# Patient Record
Sex: MALE | Race: WHITE | NOT HISPANIC OR LATINO | URBAN - METROPOLITAN AREA
[De-identification: names, ages, dates, MRNs, and addresses within clinical notes are randomized per-mention and may not be internally consistent; named-entity substitution may affect disease eponyms.]

---

## 2024-05-31 ENCOUNTER — INPATIENT (INPATIENT)
Facility: HOSPITAL | Age: 29
LOS: 1 days | Discharge: HOME | DRG: 69 | End: 2024-06-02
Attending: STUDENT IN AN ORGANIZED HEALTH CARE EDUCATION/TRAINING PROGRAM | Admitting: STUDENT IN AN ORGANIZED HEALTH CARE EDUCATION/TRAINING PROGRAM
Payer: COMMERCIAL

## 2024-05-31 VITALS
SYSTOLIC BLOOD PRESSURE: 140 MMHG | RESPIRATION RATE: 15 BRPM | HEART RATE: 86 BPM | OXYGEN SATURATION: 100 % | DIASTOLIC BLOOD PRESSURE: 70 MMHG

## 2024-05-31 LAB
A1C WITH ESTIMATED AVERAGE GLUCOSE RESULT: 5.1 % — SIGNIFICANT CHANGE UP (ref 4–5.6)
ADD ON TEST-SPECIMEN IN LAB: SIGNIFICANT CHANGE UP
ALBUMIN SERPL ELPH-MCNC: 4.2 G/DL — SIGNIFICANT CHANGE UP (ref 3.3–5)
ALP SERPL-CCNC: 76 U/L — SIGNIFICANT CHANGE UP (ref 40–120)
ALT FLD-CCNC: 24 U/L — SIGNIFICANT CHANGE UP (ref 10–45)
ANION GAP SERPL CALC-SCNC: 13 MMOL/L — SIGNIFICANT CHANGE UP (ref 5–17)
APPEARANCE UR: CLEAR — SIGNIFICANT CHANGE UP
APTT BLD: 29.2 SEC — SIGNIFICANT CHANGE UP (ref 24.5–35.6)
AST SERPL-CCNC: 16 U/L — SIGNIFICANT CHANGE UP (ref 10–40)
BASOPHILS # BLD AUTO: 0.06 K/UL — SIGNIFICANT CHANGE UP (ref 0–0.2)
BASOPHILS NFR BLD AUTO: 0.8 % — SIGNIFICANT CHANGE UP (ref 0–2)
BILIRUB SERPL-MCNC: 0.9 MG/DL — SIGNIFICANT CHANGE UP (ref 0.2–1.2)
BILIRUB UR-MCNC: NEGATIVE — SIGNIFICANT CHANGE UP
BLD GP AB SCN SERPL QL: NEGATIVE — SIGNIFICANT CHANGE UP
BUN SERPL-MCNC: 12 MG/DL — SIGNIFICANT CHANGE UP (ref 7–23)
CALCIUM SERPL-MCNC: 8.9 MG/DL — SIGNIFICANT CHANGE UP (ref 8.4–10.5)
CHLORIDE SERPL-SCNC: 107 MMOL/L — SIGNIFICANT CHANGE UP (ref 96–108)
CHOLEST SERPL-MCNC: 180 MG/DL — SIGNIFICANT CHANGE UP
CK MB CFR SERPL CALC: <1 NG/ML — SIGNIFICANT CHANGE UP (ref 0–6.7)
CK SERPL-CCNC: 51 U/L — SIGNIFICANT CHANGE UP (ref 30–200)
CK SERPL-CCNC: 56 U/L — SIGNIFICANT CHANGE UP (ref 30–200)
CO2 SERPL-SCNC: 21 MMOL/L — LOW (ref 22–31)
COLOR SPEC: YELLOW — SIGNIFICANT CHANGE UP
CREAT SERPL-MCNC: 0.91 MG/DL — SIGNIFICANT CHANGE UP (ref 0.5–1.3)
DIFF PNL FLD: NEGATIVE — SIGNIFICANT CHANGE UP
EGFR: 118 ML/MIN/1.73M2 — SIGNIFICANT CHANGE UP
EOSINOPHIL # BLD AUTO: 0.19 K/UL — SIGNIFICANT CHANGE UP (ref 0–0.5)
EOSINOPHIL NFR BLD AUTO: 2.4 % — SIGNIFICANT CHANGE UP (ref 0–6)
ESTIMATED AVERAGE GLUCOSE: 100 MG/DL — SIGNIFICANT CHANGE UP (ref 68–114)
GLUCOSE SERPL-MCNC: 89 MG/DL — SIGNIFICANT CHANGE UP (ref 70–99)
GLUCOSE UR QL: NEGATIVE MG/DL — SIGNIFICANT CHANGE UP
HCT VFR BLD CALC: 44.8 % — SIGNIFICANT CHANGE UP (ref 39–50)
HDLC SERPL-MCNC: 36 MG/DL — LOW
HGB BLD-MCNC: 15 G/DL — SIGNIFICANT CHANGE UP (ref 13–17)
IMM GRANULOCYTES NFR BLD AUTO: 0.4 % — SIGNIFICANT CHANGE UP (ref 0–0.9)
INR BLD: 1.26 — HIGH (ref 0.85–1.18)
KETONES UR-MCNC: 40 MG/DL
LACTATE SERPL-SCNC: 1 MMOL/L — SIGNIFICANT CHANGE UP (ref 0.5–2)
LEUKOCYTE ESTERASE UR-ACNC: NEGATIVE — SIGNIFICANT CHANGE UP
LIPID PNL WITH DIRECT LDL SERPL: 132 MG/DL — HIGH
LYMPHOCYTES # BLD AUTO: 2.71 K/UL — SIGNIFICANT CHANGE UP (ref 1–3.3)
LYMPHOCYTES # BLD AUTO: 34.2 % — SIGNIFICANT CHANGE UP (ref 13–44)
MAGNESIUM SERPL-MCNC: 1.9 MG/DL — SIGNIFICANT CHANGE UP (ref 1.6–2.6)
MCHC RBC-ENTMCNC: 27.7 PG — SIGNIFICANT CHANGE UP (ref 27–34)
MCHC RBC-ENTMCNC: 33.5 GM/DL — SIGNIFICANT CHANGE UP (ref 32–36)
MCV RBC AUTO: 82.8 FL — SIGNIFICANT CHANGE UP (ref 80–100)
MONOCYTES # BLD AUTO: 0.59 K/UL — SIGNIFICANT CHANGE UP (ref 0–0.9)
MONOCYTES NFR BLD AUTO: 7.4 % — SIGNIFICANT CHANGE UP (ref 2–14)
NEUTROPHILS # BLD AUTO: 4.34 K/UL — SIGNIFICANT CHANGE UP (ref 1.8–7.4)
NEUTROPHILS NFR BLD AUTO: 54.8 % — SIGNIFICANT CHANGE UP (ref 43–77)
NITRITE UR-MCNC: NEGATIVE — SIGNIFICANT CHANGE UP
NON HDL CHOLESTEROL: 144 MG/DL — HIGH
NRBC # BLD: 0 /100 WBCS — SIGNIFICANT CHANGE UP (ref 0–0)
NT-PROBNP SERPL-SCNC: <36 PG/ML — SIGNIFICANT CHANGE UP (ref 0–300)
PH UR: 7 — SIGNIFICANT CHANGE UP (ref 5–8)
PHOSPHATE SERPL-MCNC: 3 MG/DL — SIGNIFICANT CHANGE UP (ref 2.5–4.5)
PLATELET # BLD AUTO: 306 K/UL — SIGNIFICANT CHANGE UP (ref 150–400)
POTASSIUM SERPL-MCNC: 3.5 MMOL/L — SIGNIFICANT CHANGE UP (ref 3.5–5.3)
POTASSIUM SERPL-SCNC: 3.5 MMOL/L — SIGNIFICANT CHANGE UP (ref 3.5–5.3)
PROT SERPL-MCNC: 6.8 G/DL — SIGNIFICANT CHANGE UP (ref 6–8.3)
PROT UR-MCNC: NEGATIVE MG/DL — SIGNIFICANT CHANGE UP
PROTHROM AB SERPL-ACNC: 14.3 SEC — HIGH (ref 9.5–13)
RBC # BLD: 5.41 M/UL — SIGNIFICANT CHANGE UP (ref 4.2–5.8)
RBC # FLD: 12.3 % — SIGNIFICANT CHANGE UP (ref 10.3–14.5)
RH IG SCN BLD-IMP: POSITIVE — SIGNIFICANT CHANGE UP
SODIUM SERPL-SCNC: 141 MMOL/L — SIGNIFICANT CHANGE UP (ref 135–145)
SP GR SPEC: >1.03 — HIGH (ref 1–1.03)
TRIGL SERPL-MCNC: 65 MG/DL — SIGNIFICANT CHANGE UP
TROPONIN T, HIGH SENSITIVITY RESULT: 7 NG/L — SIGNIFICANT CHANGE UP (ref 0–51)
TSH SERPL-MCNC: 0.72 UIU/ML — SIGNIFICANT CHANGE UP (ref 0.27–4.2)
UROBILINOGEN FLD QL: 1 MG/DL — SIGNIFICANT CHANGE UP (ref 0.2–1)
WBC # BLD: 7.92 K/UL — SIGNIFICANT CHANGE UP (ref 3.8–10.5)
WBC # FLD AUTO: 7.92 K/UL — SIGNIFICANT CHANGE UP (ref 3.8–10.5)

## 2024-05-31 PROCEDURE — 93010 ELECTROCARDIOGRAM REPORT: CPT

## 2024-05-31 PROCEDURE — 71045 X-RAY EXAM CHEST 1 VIEW: CPT | Mod: 26

## 2024-05-31 RX ORDER — PANTOPRAZOLE SODIUM 20 MG/1
1 TABLET, DELAYED RELEASE ORAL
Refills: 0 | DISCHARGE

## 2024-05-31 RX ORDER — SODIUM CHLORIDE 9 MG/ML
500 INJECTION INTRAMUSCULAR; INTRAVENOUS; SUBCUTANEOUS ONCE
Refills: 0 | Status: COMPLETED | OUTPATIENT
Start: 2024-05-31 | End: 2024-05-31

## 2024-05-31 RX ORDER — ACETAMINOPHEN 500 MG
650 TABLET ORAL EVERY 6 HOURS
Refills: 0 | Status: DISCONTINUED | OUTPATIENT
Start: 2024-05-31 | End: 2024-06-02

## 2024-05-31 RX ORDER — ASPIRIN/CALCIUM CARB/MAGNESIUM 324 MG
1 TABLET ORAL
Refills: 0 | DISCHARGE

## 2024-05-31 RX ORDER — CLONAZEPAM 1 MG
0.5 TABLET ORAL AT BEDTIME
Refills: 0 | Status: DISCONTINUED | OUTPATIENT
Start: 2024-05-31 | End: 2024-06-02

## 2024-05-31 RX ORDER — SODIUM CHLORIDE 9 MG/ML
1000 INJECTION INTRAMUSCULAR; INTRAVENOUS; SUBCUTANEOUS
Refills: 0 | Status: DISCONTINUED | OUTPATIENT
Start: 2024-05-31 | End: 2024-06-02

## 2024-05-31 RX ORDER — CLONAZEPAM 1 MG
1 TABLET ORAL
Refills: 0 | DISCHARGE

## 2024-05-31 RX ORDER — SODIUM CHLORIDE 9 MG/ML
50 INJECTION INTRAMUSCULAR; INTRAVENOUS; SUBCUTANEOUS ONCE
Refills: 0 | Status: DISCONTINUED | OUTPATIENT
Start: 2024-05-31 | End: 2024-05-31

## 2024-05-31 RX ORDER — SODIUM CHLORIDE 9 MG/ML
3 INJECTION INTRAMUSCULAR; INTRAVENOUS; SUBCUTANEOUS EVERY 8 HOURS
Refills: 0 | Status: DISCONTINUED | OUTPATIENT
Start: 2024-05-31 | End: 2024-06-02

## 2024-05-31 RX ORDER — PANTOPRAZOLE SODIUM 20 MG/1
40 TABLET, DELAYED RELEASE ORAL
Refills: 0 | Status: DISCONTINUED | OUTPATIENT
Start: 2024-05-31 | End: 2024-06-02

## 2024-05-31 RX ADMIN — SODIUM CHLORIDE 500 MILLILITER(S): 9 INJECTION INTRAMUSCULAR; INTRAVENOUS; SUBCUTANEOUS at 19:56

## 2024-05-31 RX ADMIN — SODIUM CHLORIDE 3 MILLILITER(S): 9 INJECTION INTRAMUSCULAR; INTRAVENOUS; SUBCUTANEOUS at 22:00

## 2024-05-31 RX ADMIN — SODIUM CHLORIDE 100 MILLILITER(S): 9 INJECTION INTRAMUSCULAR; INTRAVENOUS; SUBCUTANEOUS at 20:08
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
## 2024-05-31 NOTE — H&P ADULT - NSHPPHYSICALEXAM_GEN_ALL_CORE
ICU Vital Signs   T(F): 98.7  HR: 61 (31 May 2024 20:00) (61 - 88)  BP: 105/61 (31 May 2024 20:00) (95/60 - 140/70)  BP(mean): 78 (31 May 2024 20:00) (73 - 99)  RR: 15 (31 May 2024 20:00) (15 - 18)  SpO2: 99% (31 May 2024 20:00) (98% - 100%)    O2 Parameters below as of 31 May 2024 20:00  Patient On (Oxygen Delivery Method): room air    Appearance: Normal	  HEENT:   Normal oral mucosa, PERRL, EOMI	  Neck: Supple   Cardiovascular: Normal S1 S2. No JVD. No murmurs.  Respiratory: Lungs clear to auscultation B/L. No Rales, Rhonchi, Wheezing.	  Gastrointestinal:  Soft, non-tender, + BS.	  Skin: No rashes. No ecchymoses. No cyanosis.  Extremities: Normal range of motion, no clubbing, cyanosis or edema.  Vascular: Peripheral pulses palpable 2+ bilaterally.  Neurologic: Non-focal.  Psychiatry: A & O x 3, mood & affect appropriate.

## 2024-05-31 NOTE — PATIENT PROFILE ADULT - INTERNATIONAL TRAVEL
Repair Performed By Another Provider Text (Leave Blank If You Do Not Want): After obtaining clear surgical margins the defect was repaired by another provider. No
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
## 2024-05-31 NOTE — H&P ADULT - NSHPREVIEWOFSYSTEMS_GEN_ALL_CORE
Review of Systems  CONSTITUTIONAL:  Denies Fevers / chills, sweats, fatigue, weight loss, weight gain                                      NEURO:  Denies parathesias, seizures, syncope, confusion                                                                                EYES:  Denies Blurry vision, discharge, pain, loss of vision                                                                                    ENMT:  Denies Difficulty hearing, vertigo, dysphagia, epistaxis, recent dental work                                       CV:  Denies Chest pain, palpitations, JANG, orthopnea                                                                                          RESPIRATORY:  Denies Wheezing, SOB, cough / sputum, hemoptysis                                                                GI:  Denies Nausea, vommiting, diarrhea, constipation, melena, difficulty swallowing                                               : Denies Hematuria, dysuria, urgency, incontinence                                                                                         MUSKULOSKELETAL:  Denies arthritis, joint swelling, muscle weakness                                                             SKIN/BREAST:  Denies rash, itching, vincenzo loss, masses                                                                                            PSYCH:  Denies depresion, anxiety, suicidal ideation                                                                                               HEME/LYMPH:  Denies bruises easily, enlarged lymph nodes, tender lymph nodes                                        ENDOCRINE:  Denies cold intolerance, heat intolerance, polydipsia CONSTITUTIONAL: No fevers / chills, sweats, fatigue, weight loss, weight gain  NEUROLOGICAL: No parathesias, seizures, syncope, confusion  EYES: No blurry vision, discharge, pain, loss of vision  ENMT: No difficulty hearing, vertigo, dysphagia, epistaxis, recent dental work  CV: No chest pain, palpitations, JANG, orthopnea  RESPIRATORY:  No wheezing, SOB, cough / sputum, hemoptysis  GI: No nausea, vomiting, diarrhea, constipation, melena  : No hematuria, dysuria, urgency, incontinence  MUSCULOSKELETAL: No arthritis, joint swelling, muscle weakness  SKIN/BREAST: No rash, itching, hair loss, masses  PSYCHIATRY: No depression, anxiety, suicidal ideation  HEMATOLOGY:  No bruising easily, enlarged lymph nodes, tender lymph nodes  ENDOCRINE: No cold intolerance, heat intolerance, polydipsia

## 2024-06-01 LAB
ALBUMIN SERPL ELPH-MCNC: 3.8 G/DL — SIGNIFICANT CHANGE UP (ref 3.3–5)
ALP SERPL-CCNC: 68 U/L — SIGNIFICANT CHANGE UP (ref 40–120)
ALT FLD-CCNC: 22 U/L — SIGNIFICANT CHANGE UP (ref 10–45)
ANION GAP SERPL CALC-SCNC: 10 MMOL/L — SIGNIFICANT CHANGE UP (ref 5–17)
APTT BLD: 28 SEC — SIGNIFICANT CHANGE UP (ref 24.5–35.6)
AST SERPL-CCNC: 19 U/L — SIGNIFICANT CHANGE UP (ref 10–40)
BILIRUB SERPL-MCNC: 0.6 MG/DL — SIGNIFICANT CHANGE UP (ref 0.2–1.2)
BLD GP AB SCN SERPL QL: NEGATIVE — SIGNIFICANT CHANGE UP
BUN SERPL-MCNC: 14 MG/DL — SIGNIFICANT CHANGE UP (ref 7–23)
CALCIUM SERPL-MCNC: 9 MG/DL — SIGNIFICANT CHANGE UP (ref 8.4–10.5)
CHLORIDE SERPL-SCNC: 108 MMOL/L — SIGNIFICANT CHANGE UP (ref 96–108)
CO2 SERPL-SCNC: 23 MMOL/L — SIGNIFICANT CHANGE UP (ref 22–31)
CREAT SERPL-MCNC: 1.01 MG/DL — SIGNIFICANT CHANGE UP (ref 0.5–1.3)
EGFR: 104 ML/MIN/1.73M2 — SIGNIFICANT CHANGE UP
GLUCOSE SERPL-MCNC: 109 MG/DL — HIGH (ref 70–99)
HCT VFR BLD CALC: 42.5 % — SIGNIFICANT CHANGE UP (ref 39–50)
HGB BLD-MCNC: 14.5 G/DL — SIGNIFICANT CHANGE UP (ref 13–17)
INR BLD: 1.16 — SIGNIFICANT CHANGE UP (ref 0.85–1.18)
MAGNESIUM SERPL-MCNC: 2 MG/DL — SIGNIFICANT CHANGE UP (ref 1.6–2.6)
MCHC RBC-ENTMCNC: 27.9 PG — SIGNIFICANT CHANGE UP (ref 27–34)
MCHC RBC-ENTMCNC: 34.1 GM/DL — SIGNIFICANT CHANGE UP (ref 32–36)
MCV RBC AUTO: 81.9 FL — SIGNIFICANT CHANGE UP (ref 80–100)
NRBC # BLD: 0 /100 WBCS — SIGNIFICANT CHANGE UP (ref 0–0)
PHOSPHATE SERPL-MCNC: 4 MG/DL — SIGNIFICANT CHANGE UP (ref 2.5–4.5)
PLATELET # BLD AUTO: 308 K/UL — SIGNIFICANT CHANGE UP (ref 150–400)
POTASSIUM SERPL-MCNC: 4.1 MMOL/L — SIGNIFICANT CHANGE UP (ref 3.5–5.3)
POTASSIUM SERPL-SCNC: 4.1 MMOL/L — SIGNIFICANT CHANGE UP (ref 3.5–5.3)
PROT SERPL-MCNC: 6.3 G/DL — SIGNIFICANT CHANGE UP (ref 6–8.3)
PROTHROM AB SERPL-ACNC: 13.2 SEC — HIGH (ref 9.5–13)
RBC # BLD: 5.19 M/UL — SIGNIFICANT CHANGE UP (ref 4.2–5.8)
RBC # FLD: 12.6 % — SIGNIFICANT CHANGE UP (ref 10.3–14.5)
RH IG SCN BLD-IMP: POSITIVE — SIGNIFICANT CHANGE UP
SODIUM SERPL-SCNC: 141 MMOL/L — SIGNIFICANT CHANGE UP (ref 135–145)
WBC # BLD: 9.2 K/UL — SIGNIFICANT CHANGE UP (ref 3.8–10.5)
WBC # FLD AUTO: 9.2 K/UL — SIGNIFICANT CHANGE UP (ref 3.8–10.5)

## 2024-06-01 PROCEDURE — 71275 CT ANGIOGRAPHY CHEST: CPT | Mod: 26

## 2024-06-01 PROCEDURE — 74174 CTA ABD&PLVS W/CONTRAST: CPT | Mod: 26

## 2024-06-01 PROCEDURE — 99232 SBSQ HOSP IP/OBS MODERATE 35: CPT

## 2024-06-01 RX ORDER — HEPARIN SODIUM 5000 [USP'U]/ML
5000 INJECTION INTRAVENOUS; SUBCUTANEOUS EVERY 8 HOURS
Refills: 0 | Status: DISCONTINUED | OUTPATIENT
Start: 2024-06-01 | End: 2024-06-02

## 2024-06-01 RX ADMIN — SODIUM CHLORIDE 3 MILLILITER(S): 9 INJECTION INTRAMUSCULAR; INTRAVENOUS; SUBCUTANEOUS at 13:21

## 2024-06-01 RX ADMIN — HEPARIN SODIUM 5000 UNIT(S): 5000 INJECTION INTRAVENOUS; SUBCUTANEOUS at 13:32

## 2024-06-01 RX ADMIN — SODIUM CHLORIDE 3 MILLILITER(S): 9 INJECTION INTRAMUSCULAR; INTRAVENOUS; SUBCUTANEOUS at 21:56

## 2024-06-01 RX ADMIN — HEPARIN SODIUM 5000 UNIT(S): 5000 INJECTION INTRAVENOUS; SUBCUTANEOUS at 21:34

## 2024-06-01 RX ADMIN — SODIUM CHLORIDE 3 MILLILITER(S): 9 INJECTION INTRAMUSCULAR; INTRAVENOUS; SUBCUTANEOUS at 06:31

## 2024-06-01 RX ADMIN — PANTOPRAZOLE SODIUM 40 MILLIGRAM(S): 20 TABLET, DELAYED RELEASE ORAL at 07:18

## 2024-06-01 RX ADMIN — Medication 0.5 MILLIGRAM(S): at 01:38

## 2024-06-01 NOTE — CONSULT NOTE ADULT - ASSESSMENT
A/P:  Patient is a 28 year old male hx of anxiety otherwise no significant PMH/PSG hx who was transferred to [facility] for work-up of LUE numbness tingling. Multiple CT scans done outpatient and at [facility] showing hx of occluded vs. isolated L SCA with concern for Subclavian steal vs. thoracic outlet syndrome. Vascular consulted for management reccs.     Plan:   -Pending discussion with attending A/P:  Patient is a 28 year old male hx of anxiety otherwise no significant PMH/PSG hx who was transferred to [facility] for work-up of LUE numbness tingling. Multiple CT scans done outpatient and at [facility] showing hx of occluded vs. isolated L SCA with concern for Subclavian steal vs. thoracic outlet syndrome. Vascular consulted for management reccs.     Plan:   -Patients Imaging reveals an embryologic abnormality that is most likely not contributing to patient's symptoms.   -Patient pathology is not acute in nature and therefore does not require urgent w/u or evaluation.   -No vascular problem is suspected. The etiology of the patients symptoms are unclear.    -Therefore no vascular intervention is indicated at this time   -Please follow-up with Dr. Boss in clinic   -Vascular will sign-off at this time     A/P:  Patient is a 28 year old male hx of anxiety otherwise no significant PMH/PSG hx who was transferred to [facility] for work-up of LUE numbness tingling. Multiple CT scans done outpatient and at [facility] showing hx of occluded vs. isolated L SCA with concern for Subclavian steal vs. thoracic outlet syndrome. Vascular consulted for management reccs.     Plan:   -Patients Imaging reveals an embryologic abnormality that is most likely not contributing to patient's symptoms.   -Patient pathology is not acute in nature and therefore does not require urgent w/u or evaluation.   -No vascular problem is suspected. The etiology of the patients symptoms are unclear.    -Therefore no vascular intervention is indicated at this time   -Please follow-up with Dr. Boss in clinic. Call [phone] to confirm appointment time   -Vascular will sign-off at this time

## 2024-06-01 NOTE — PROGRESS NOTE ADULT - ASSESSMENT
28 year old  male with history of anxiety who presented to [facility] yesterday after having LUE numbness and tingling since 08/2023. Work up and evaluation at  included CT showing: Right-sided arch with a severely atretic versus occluded left subclavian artery origin between the aorta and the confluence of the left subclavian and left vertebral arteries. This is consistent with the clinical suspicion of subclavian steal. Transferred to [facility] for evaluation, admitted to CTICU. Repeat CT scan reviewed by Dr. Hassan, vascular consult for thoracic outlet syndrome. Transferred to .    A/P:  Neurovascular: No delirium. Pain well controlled with current regimen.  -PRN's.    Cardiovascular: Hemodynamically stable. HR controlled.  -Hemodynamically stable, CT reviewed by Dr. Hassan- NTD  -Thoracic outlet syndrome- vascular evaluation       Respiratory: 02 Sat = 98% on RA.  -Encourage ambulation, C+DB and Use of IS 10x / hr while awake.  -CXR    GI: Stable.  -protonix for GI PPX.  -Continue bowel regimen  -PO Diet.    Renal / : BUN/Cr stable  -IVF for 3 dye loads   -Monitor renal function.  -Monitor I/O's.  -Replete lytes PRN    Endocrine:    -A1c.  -TSH.    Hematologic: WNL  -f/u AM CBC    ID: Afebrile, WBC 9  -Observe for SIRS/Sepsis Syndrome.    Prophylaxis:  -DVT prophylaxis with 5000 SubQ Heparin q8h.  -SCD's    Disposition:  -Home when medically ready

## 2024-06-01 NOTE — CONSULT NOTE ADULT - SUBJECTIVE AND OBJECTIVE BOX
Vascular Attending:  Dr. Boss  HPI: Patient is a 28 year old male hx of anxiety otherwise no significant PMH/PSG hx who was transferred to [facility] for work-up of LUE numbness tingling. 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 The patient initially thought it was positional but he remained symptomatic for days after which prompted him to present to [facility] where he received a work-up including CT revealing a Right sided aortic arch with an atretic SCA that was occluded at the origin. He was diagnosed with subclavian steal at the time but did not undergo any intervention.  Recently in the last few months his symptoms have become more frequent and more severe which caused him to present to [facility] again where he underwent another CT with similar findings to his one from last year. He was transferred to the cardiac surgery service at [facility] for further work-up. 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 Upon interview patient confirms  that his symptoms have gotten worse and more frequent over the last few months. In addition to numbness/tingling he recently had an episode of severe contracture of both hands which caused him significant discomfort. He denies ever experiencing a feeling of his hand giving out or any weakness in the affected extremity. He denies a hx of syncope, balance/gait issue but does admit to random bouts of blurry vision. Patient states he has frequently been told he has a BP differential of 20-30 points between his arms. He denies hx of chest pain, SOB, nausea, vomiting, fever, claudication/rest pain. Vascular was consulted for evaluation of imaging findings and possible need for intervention for symptoms.            PMH / PSH: None          PAST MEDICAL & SURGICAL HISTORY: None      REVIEW OF SYSTEMS: As per HPI    MEDICATIONS  (STANDING):  heparin   Injectable 5000 Unit(s) SubCutaneous every 8 hours  pantoprazole    Tablet 40 milliGRAM(s) Oral before breakfast  sodium chloride 0.9% lock flush 3 milliLiter(s) IV Push every 8 hours  sodium chloride 0.9%. 1000 milliLiter(s) (100 mL/Hr) IV Continuous <Continuous>    MEDICATIONS  (PRN):  acetaminophen     Tablet .. 650 milliGRAM(s) Oral every 6 hours PRN Mild Pain (1 - 3)  clonazePAM  Tablet 0.5 milliGRAM(s) Oral at bedtime PRN Anxiety      Allergies: None        SOCIAL HISTORY:  Smoked 1 PPD X 7-9 Years (Quit 3 Months Ago.). No ETOH Abuse. No IVRDU. 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    FAMILY HISTORY: Father hx of quadruple bypass.      Vital Signs Last 24 Hrs  T(C): 36.4 (01 Jun 2024 17:28), Max: 36.5 (01 Jun 2024 12:47)  T(F): 97.6 (01 Jun 2024 17:28), Max: 97.7 (01 Jun 2024 12:47)  HR: 92 (01 Jun 2024 14:05) (47 - 92)  BP: 124/63 (01 Jun 2024 14:05) (95/60 - 140/70)    -**L arm BP: 106/56. R arm /52  BP(mean): 88 (01 Jun 2024 14:05) (70 - 99)  RR: 11 (01 Jun 2024 14:05) (11 - 18)  SpO2: 95% (01 Jun 2024 14:05) (94% - 100%)    Parameters below as of 01 Jun 2024 14:05  Patient On (Oxygen Delivery Method): room air        PHYSICAL EXAM:  Appearance: Anxious but NAD.  HEENT:   Normal oral mucosa, PERRL, EOMI	  Neck: Supple   Cardiovascular: RRR  Respiratory: Normal Respiratory effort  Gastrointestinal:  Soft, non-tender, + BS.	  Skin: No rashes. No ecchymoses. No cyanosis.  Extremities: Normal range of motion, no clubbing, cyanosis or edema.  Vascular: B/L LE pulses palpable.                RUE: Palpable radial/ulnar pulse                LUE: Palpable radial/ulnar pulse  Extremities: No changes in muscle mass or skin changes noted between  upper extremities. No significant edema of either upper extremity.  Neurologic: Non-focal.  Psychiatry: A & O x 3, mood & affect appropriate.            LABS:                        14.5   9.20  )-----------( 308      ( 01 Jun 2024 01:01 )             42.5     06-01    141  |  108  |  14  ----------------------------<  109<H>  4.1   |  23  |  1.01    Ca    9.0      01 Jun 2024 01:01  Phos  4.0     06-01  Mg     2.0     06-01    TPro  6.3  /  Alb  3.8  /  TBili  0.6  /  DBili  x   /  AST  19  /  ALT  22  /  AlkPhos  68  06-01    PT/INR - ( 01 Jun 2024 01:01 )   PT: 13.2 sec;   INR: 1.16          PTT - ( 01 Jun 2024 01:01 )  PTT:28.0 sec  Urinalysis Basic - ( 01 Jun 2024 01:01 )    Color: x / Appearance: x / SG: x / pH: x  Gluc: 109 mg/dL / Ketone: x  / Bili: x / Urobili: x   Blood: x / Protein: x / Nitrite: x   Leuk Esterase: x / RBC: x / WBC x   Sq Epi: x / Non Sq Epi: x / Bacteria: x        RADIOLOGY & ADDITIONAL STUDIES:        ACC: [id number] EXAM: CT ANGIO CHEST AORTA WAWIC ORDERED BY: PHU MCKEON    ACC: [id number] EXAM: CT ANGIO ABD PELV (W)AW IC ORDERED BY: PHU MCKEON    PROCEDURE DATE: 06/01/2024        INTERPRETATION: CLINICAL INFORMATION: Subclavian steal syndrome with chest pain LEFT arm tingling and numbness.    COMPARISON: None.    CONTRAST/COMPLICATIONS:  IV Contrast: Isovue 370 90 cc administered 10 cc discarded  Oral Contrast: NONE  Complications: None reported at time of study completion    PROCEDURE:  CT Angiography of the Chest, Abdomen and Pelvis.  Precontrast imaging was performed through the chest followed by arterial phase imaging of the chest, abdomen and pelvis.  Sagittal and coronal reformats were performed as well as 3D (MIP) reconstructions.    FINDINGS:  CHEST:  LUNGS AND LARGE AIRWAYS: Patent central airways. No pulmonary nodules.  PLEURA: No pleural effusion.  VESSELS: No thoracic aortic dissection, intramural hematoma, or aneurysm. Right-sided aortic arch. Patent innominate and left common carotid artery. The proximal left subclavian artery is isolated and not connected to the aortic arch. This is consistent with RIGHT aortic arch with isolated LEFT subclavian artery variant. There is opacification of the left subclavian artery only distal to the opacified left vertebral artery, representing subclavian steal. No central pulmonary embolism.  HEART: Heart size is normal. No pericardial effusion.  MEDIASTINUM AND GUSTABO: No lymphadenopathy.  CHEST WALL AND LOWER NECK: Symmetric bilateral gynecomastia.    ABDOMEN AND PELVIS:  LIVER: Hepatic steatosis.  BILE DUCTS: Normal caliber.  GALLBLADDER: Within normal limits.  SPLEEN: Within normal limits.  PANCREAS: Within normal limits.  ADRENALS: Within normal limits.  KIDNEYS/URETERS: Within normal limits.    BLADDER: Underdistended, which limits evaluation.  REPRODUCTIVE ORGANS: Prostate within normal limits.    BOWEL: No bowel obstruction. Appendix is normal. Minimal colonic diverticulosis.  PERITONEUM: No ascites.  VESSELS: No abdominal aortic dissection or aneurysm. The celiac trunk, superior mesenteric, renal, inferior mesenteric, common iliac, external and internal iliac, and common femoral/proximal superficial femoral arteries are widely patent.  RETROPERITONEUM/LYMPH NODES: No lymphadenopathy.  ABDOMINAL WALL: Within normal limits.  BONES: Grade 1 anterolisthesis of L5 on S1. Chronic bilateral L5 pars defects.    IMPRESSION:  1. No aortic dissection.  2. 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

## 2024-06-02 ENCOUNTER — TRANSCRIPTION ENCOUNTER (OUTPATIENT)
Age: 29
End: 2024-06-02

## 2024-06-02 VITALS
SYSTOLIC BLOOD PRESSURE: 121 MMHG | DIASTOLIC BLOOD PRESSURE: 71 MMHG | OXYGEN SATURATION: 98 % | HEART RATE: 74 BPM | RESPIRATION RATE: 18 BRPM

## 2024-06-02 PROCEDURE — 82550 ASSAY OF CK (CPK): CPT

## 2024-06-02 PROCEDURE — 86900 BLOOD TYPING SEROLOGIC ABO: CPT

## 2024-06-02 PROCEDURE — 86850 RBC ANTIBODY SCREEN: CPT

## 2024-06-02 PROCEDURE — 84443 ASSAY THYROID STIM HORMONE: CPT

## 2024-06-02 PROCEDURE — 83735 ASSAY OF MAGNESIUM: CPT

## 2024-06-02 PROCEDURE — 83880 ASSAY OF NATRIURETIC PEPTIDE: CPT

## 2024-06-02 PROCEDURE — 85610 PROTHROMBIN TIME: CPT

## 2024-06-02 PROCEDURE — 74174 CTA ABD&PLVS W/CONTRAST: CPT | Mod: MC

## 2024-06-02 PROCEDURE — 80061 LIPID PANEL: CPT

## 2024-06-02 PROCEDURE — 71275 CT ANGIOGRAPHY CHEST: CPT | Mod: MC

## 2024-06-02 PROCEDURE — 83036 HEMOGLOBIN GLYCOSYLATED A1C: CPT

## 2024-06-02 PROCEDURE — 93005 ELECTROCARDIOGRAM TRACING: CPT

## 2024-06-02 PROCEDURE — 84484 ASSAY OF TROPONIN QUANT: CPT

## 2024-06-02 PROCEDURE — 82553 CREATINE MB FRACTION: CPT

## 2024-06-02 PROCEDURE — 83605 ASSAY OF LACTIC ACID: CPT

## 2024-06-02 PROCEDURE — 71045 X-RAY EXAM CHEST 1 VIEW: CPT

## 2024-06-02 PROCEDURE — 99239 HOSP IP/OBS DSCHRG MGMT >30: CPT

## 2024-06-02 PROCEDURE — 84100 ASSAY OF PHOSPHORUS: CPT

## 2024-06-02 PROCEDURE — 85730 THROMBOPLASTIN TIME PARTIAL: CPT

## 2024-06-02 PROCEDURE — 81003 URINALYSIS AUTO W/O SCOPE: CPT

## 2024-06-02 PROCEDURE — 85027 COMPLETE CBC AUTOMATED: CPT

## 2024-06-02 PROCEDURE — 80053 COMPREHEN METABOLIC PANEL: CPT

## 2024-06-02 PROCEDURE — 36415 COLL VENOUS BLD VENIPUNCTURE: CPT

## 2024-06-02 PROCEDURE — G0378: CPT

## 2024-06-02 PROCEDURE — 86901 BLOOD TYPING SEROLOGIC RH(D): CPT

## 2024-06-02 PROCEDURE — 85025 COMPLETE CBC W/AUTO DIFF WBC: CPT

## 2024-06-02 RX ORDER — ACETAMINOPHEN 500 MG
2 TABLET ORAL
Qty: 0 | Refills: 0 | DISCHARGE
Start: 2024-06-02

## 2024-06-02 RX ADMIN — HEPARIN SODIUM 5000 UNIT(S): 5000 INJECTION INTRAVENOUS; SUBCUTANEOUS at 05:49

## 2024-06-02 RX ADMIN — SODIUM CHLORIDE 3 MILLILITER(S): 9 INJECTION INTRAMUSCULAR; INTRAVENOUS; SUBCUTANEOUS at 15:36

## 2024-06-02 RX ADMIN — PANTOPRAZOLE SODIUM 40 MILLIGRAM(S): 20 TABLET, DELAYED RELEASE ORAL at 05:49

## 2024-06-02 RX ADMIN — SODIUM CHLORIDE 3 MILLILITER(S): 9 INJECTION INTRAMUSCULAR; INTRAVENOUS; SUBCUTANEOUS at 05:20

## 2024-06-02 NOTE — DISCHARGE NOTE PROVIDER - CARE PROVIDER_API CALL
Walker Tolentino  Thoracic and Cardiac Surgery  [address] [address]  Phone: [phone]  Fax: [phone]  Follow Up Time: 1 week    Florencio Boss  Vascular Surgery  [address]  [place] [address]  Phone: [phone]  Fax: [phone]  Follow Up Time: 2 weeks   Walker Tolentino  Thoracic and Cardiac Surgery  [address] [address]  Phone: [phone]  Fax: [phone]  Follow Up Time: 1 week    Florencio Boss  Vascular Surgery  [address]  [place] [address]  Phone: [phone]  Fax: [phone]  Follow Up Time: 2 weeks    Henry Zamorano  Thoracic Surgery  [address] [address]  Phone: [phone]  Fax: [phone]  Follow Up Time:    Walker Tolentino  Thoracic and Cardiac Surgery  [address] [address]  Phone: [phone]  Fax: [phone]  Follow Up Time: 2 weeks    Florencio Boss  Vascular Surgery  [address]  [place] [address]  Phone: [phone]  Fax: [phone]  Follow Up Time: 2 weeks    Henry Zamorano  Thoracic Surgery  [address] [address]  Phone: [phone]  Fax: [phone]  Follow Up Time: 2 weeks

## 2024-06-02 NOTE — DISCHARGE NOTE PROVIDER - NSDCFUADDINST_GEN_ALL_CORE_FT
-Walk daily as tolerated and use your incentive spirometer 10 times every hour while you are awake.     -Call your doctor if you have shortness of breath, chest pain not relieved by pain medication, dizziness, fever >100.5, or increased redness or drainage from incisions.

## 2024-06-02 NOTE — DISCHARGE NOTE PROVIDER - NSDCFUADDAPPT_GEN_ALL_CORE_FT
The office will call you with follow-up appointment time tomorrow. If you do not receive a call, please call [phone].

## 2024-06-02 NOTE — DISCHARGE NOTE PROVIDER - HOSPITAL COURSE
Patient discussed on morning rounds with Dr. Hassan    Operation Date: No surgery this admission    Primary Surgeon/Attending MD: Dr. Tolentino    Referring Physician: No referring  _ _ _ _ _ _ _ _ _ _ _ _  HOSPITAL COURSE:  27 YO Male w/ PMHx of anxiety who initially presented to [facility] c/o LUE numbness and tingling since 08/2023. Work up and evaluation at OSH included CT which revealed right-sided arch with a severely atretic versus occluded left subclavian artery origin between the aorta and the confluence of the left subclavian and left vertebral arteries. This is consistent with the clinical suspicion of subclavian steal. Transferred to [facility] for further evaluation and admitted to CTICU under the care of Dr. Tolentino. Repeat CT scan reviewed by Dr. Hassan, vascular consulted for thoracic outlet syndrome. Per Vascular team, no indication for intervention acutely and recommended outpatient follow-up. Transferred to [facility]. On 6/2/24 patient cleared for discharge home per Dr. Hassan with outpatient follow-up.  _ _ _ _ _ _ _ _ _ _ _ _  DISCHARGE PHYSICAL EXAM:  PHYSICAL EXAM:  GENERAL: NAD, lying in bed comfortably  HEAD:  Atraumatic, Normocephalic  EYES: EOMI, PERRLA, conjunctiva and sclera clear  ENT: Moist mucous membranes  NECK: Supple, No JVD  CHEST/LUNG: CTAB  HEART: RRR  ABDOMEN: Soft, Nontender, Nondistended. No hepatomegally  EXTREMITIES:  2+ Peripheral Pulses, brisk capillary refill. No clubbing, cyanosis, or edema  NERVOUS SYSTEM:  Alert & Oriented X3, speech clear. No deficits   _ _ _ _ _ _ _ _ _ _ _ _  REMOVAL CHECKLIST:        [N/A] Epicardial wires          [N/A] Stitches/tie downs,   If no, why? ______          [N/A] PICC/Midline,   If no, why? ________  _ _ _ _ _ _ _ _ _ _ _ _  RELEVANT LABS/IMAGING:  < from: CT Angio Abdomen and Pelvis w/ IV Cont (06.01.24 @ 12:29) >    FINDINGS:  CHEST:  LUNGS AND LARGE AIRWAYS: Patent central airways. No pulmonary nodules.  PLEURA: No pleural effusion.  VESSELS: No thoracic aortic dissection, intramural hematoma, or aneurysm.   Right-sided aortic arch. Patent innominate and left common carotid   artery. The proximal left subclavian artery is isolated and not connected   to the aortic arch. This is consistent with RIGHT aortic arch with   isolated LEFT subclavian artery variant. There is opacification ofthe   left subclavian artery only distal to the opacified left vertebral   artery, representing subclavian steal. No central pulmonary embolism.  HEART: Heart size is normal. No pericardial effusion.  MEDIASTINUM AND GUSTABO: No lymphadenopathy.  CHEST WALL AND LOWER NECK: Symmetric bilateral gynecomastia.    ABDOMEN AND PELVIS:  LIVER: Hepatic steatosis.  BILE DUCTS: Normal caliber.  GALLBLADDER: Within normal limits.  SPLEEN: Within normal limits.  PANCREAS: Within normal limits.  ADRENALS: Withinnormal limits.  KIDNEYS/URETERS: Within normal limits.    BLADDER: Underdistended, which limits evaluation.  REPRODUCTIVE ORGANS: Prostate within normal limits.    BOWEL: No bowel obstruction. Appendix is normal. Minimal colonic   diverticulosis.  PERITONEUM: No ascites.  VESSELS: No abdominal aortic dissection or aneurysm. The celiac trunk,   superior mesenteric, renal, inferior mesenteric, common iliac, external   and internal iliac, and common femoral/proximal superficial femoral   arteries are widely patent.  RETROPERITONEUM/LYMPH NODES: No lymphadenopathy.  ABDOMINAL WALL: Within normal limits.  BONES: Grade 1 anterolisthesis of L5 on S1. Chronic bilateral L5 pars   defects.    IMPRESSION:  1.  No aortic dissection.  2.  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  _ _ _ _ _ _ _ _ _ _ _ _  Over 35 minutes was spent with the patient reviewing the discharge material including medications, follow up appointments, recovery, concerning symptoms, and how to contact their health care providers if they have questions   Patient discussed on morning rounds with Dr. Hassan    Operation Date: No surgery this admission    Primary Surgeon/Attending MD: Dr. Tolentino    Referring Physician: No referring  _ _ _ _ _ _ _ _ _ _ _ _  HOSPITAL COURSE:  29 YO Male w/ PMHx of anxiety who initially presented to [facility] c/o LUE numbness and tingling since 08/2023. Work up and evaluation at OSH included CT which revealed right-sided arch with a severely atretic versus occluded left subclavian artery origin between the aorta and the confluence of the left subclavian and left vertebral arteries. This is consistent with the clinical suspicion of subclavian steal. Transferred to [facility] for further evaluation and admitted to CTICU under the care of Dr. Tolentino. Repeat CT scan reviewed by Dr. Hassan, vascular consulted for thoracic outlet syndrome. Per Vascular team, no indication for intervention acutely and recommended outpatient follow-up. Transferred to [facility]. On 6/2/24 Thoracic surgery Dr. Zamorano consulted for possible thoracic outlet syndrome, recommended outpatient work-up. Patient cleared for discharge home per Dr. Hassan with outpatient follow-up.  _ _ _ _ _ _ _ _ _ _ _ _  DISCHARGE PHYSICAL EXAM:  PHYSICAL EXAM:  GENERAL: NAD, lying in bed comfortably  HEAD:  Atraumatic, Normocephalic  EYES: EOMI, PERRLA, conjunctiva and sclera clear  ENT: Moist mucous membranes  NECK: Supple, No JVD  CHEST/LUNG: CTAB  HEART: RRR  ABDOMEN: Soft, Nontender, Nondistended. No hepatomegally  EXTREMITIES:  2+ Peripheral Pulses, brisk capillary refill. No clubbing, cyanosis, or edema  NERVOUS SYSTEM:  Alert & Oriented X3, speech clear. No deficits   _ _ _ _ _ _ _ _ _ _ _ _  REMOVAL CHECKLIST:        [N/A] Epicardial wires          [N/A] Stitches/tie downs,   If no, why? ______          [N/A] PICC/Midline,   If no, why? ________  _ _ _ _ _ _ _ _ _ _ _ _  RELEVANT LABS/IMAGING:  < from: CT Angio Abdomen and Pelvis w/ IV Cont (06.01.24 @ 12:29) >    FINDINGS:  CHEST:  LUNGS AND LARGE AIRWAYS: Patent central airways. No pulmonary nodules.  PLEURA: No pleural effusion.  VESSELS: No thoracic aortic dissection, intramural hematoma, or aneurysm.   Right-sided aortic arch. Patent innominate and left common carotid   artery. The proximal left subclavian artery is isolated and not connected   to the aortic arch. This is consistent with RIGHT aortic arch with   isolated LEFT subclavian artery variant. There is opacification ofthe   left subclavian artery only distal to the opacified left vertebral   artery, representing subclavian steal. No central pulmonary embolism.  HEART: Heart size is normal. No pericardial effusion.  MEDIASTINUM AND GUSTABO: No lymphadenopathy.  CHEST WALL AND LOWER NECK: Symmetric bilateral gynecomastia.    ABDOMEN AND PELVIS:  LIVER: Hepatic steatosis.  BILE DUCTS: Normal caliber.  GALLBLADDER: Within normal limits.  SPLEEN: Within normal limits.  PANCREAS: Within normal limits.  ADRENALS: Withinnormal limits.  KIDNEYS/URETERS: Within normal limits.    BLADDER: Underdistended, which limits evaluation.  REPRODUCTIVE ORGANS: Prostate within normal limits.    BOWEL: No bowel obstruction. Appendix is normal. Minimal colonic   diverticulosis.  PERITONEUM: No ascites.  VESSELS: No abdominal aortic dissection or aneurysm. The celiac trunk,   superior mesenteric, renal, inferior mesenteric, common iliac, external   and internal iliac, and common femoral/proximal superficial femoral   arteries are widely patent.  RETROPERITONEUM/LYMPH NODES: No lymphadenopathy.  ABDOMINAL WALL: Within normal limits.  BONES: Grade 1 anterolisthesis of L5 on S1. Chronic bilateral L5 pars   defects.    IMPRESSION:  1.  No aortic dissection.  2.  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  _ _ _ _ _ _ _ _ _ _ _ _  Over 35 minutes was spent with the patient reviewing the discharge material including medications, follow up appointments, recovery, concerning symptoms, and how to contact their health care providers if they have questions   Patient discussed on morning rounds with Dr. Hassan    Operation Date: No surgery this admission    Primary Surgeon/Attending MD: Dr. Tolentino    Referring Physician: No referring  _ _ _ _ _ _ _ _ _ _ _ _  HOSPITAL COURSE:  29 YO Male w/ PMHx of anxiety who initially presented to [facility] c/o LUE numbness and tingling since 08/2023. Work up and evaluation at OSH included CT which revealed right-sided arch with a severely atretic versus occluded left subclavian artery origin between the aorta and the confluence of the left subclavian and left vertebral arteries. This is consistent with the clinical suspicion of subclavian steal. Transferred to [facility] for further evaluation and admitted to CTICU under the care of Dr. Tolentino. Repeat CT scan reviewed by Dr. Hassan, vascular consulted for thoracic outlet syndrome. Per Vascular team, no indication for intervention acutely and recommended outpatient follow-up. Transferred to [facility]. On 6/2/24 Thoracic surgery Dr. Zamorano consulted for possible thoracic outlet syndrome, recommended outpatient work-up. Patient cleared for discharge home per Dr. Hassan with outpatient follow-up.  _ _ _ _ _ _ _ _ _ _ _ _  DISCHARGE PHYSICAL EXAM:  GENERAL: NAD, lying in bed comfortably  HEAD:  Atraumatic, Normocephalic  EYES: EOMI, PERRLA, conjunctiva and sclera clear  ENT: Moist mucous membranes  NECK: Supple, No JVD  CHEST/LUNG: CTAB  HEART: RRR  ABDOMEN: Soft, Nontender, Nondistended. No hepatomegally  EXTREMITIES:  2+ Peripheral Pulses, brisk capillary refill. No clubbing, cyanosis, or edema  NERVOUS SYSTEM:  Alert & Oriented X3, speech clear. No deficits   _ _ _ _ _ _ _ _ _ _ _ _  REMOVAL CHECKLIST:        [N/A] Epicardial wires          [N/A] Stitches/tie downs,   If no, why? ______          [N/A] PICC/Midline,   If no, why? ________  _ _ _ _ _ _ _ _ _ _ _ _  RELEVANT LABS/IMAGING:  < from: CT Angio Abdomen and Pelvis w/ IV Cont (06.01.24 @ 12:29) >    FINDINGS:  CHEST:  LUNGS AND LARGE AIRWAYS: Patent central airways. No pulmonary nodules.  PLEURA: No pleural effusion.  VESSELS: No thoracic aortic dissection, intramural hematoma, or aneurysm.   Right-sided aortic arch. Patent innominate and left common carotid   artery. The proximal left subclavian artery is isolated and not connected   to the aortic arch. This is consistent with RIGHT aortic arch with   isolated LEFT subclavian artery variant. There is opacification ofthe   left subclavian artery only distal to the opacified left vertebral   artery, representing subclavian steal. No central pulmonary embolism.  HEART: Heart size is normal. No pericardial effusion.  MEDIASTINUM AND GUSTABO: No lymphadenopathy.  CHEST WALL AND LOWER NECK: Symmetric bilateral gynecomastia.    ABDOMEN AND PELVIS:  LIVER: Hepatic steatosis.  BILE DUCTS: Normal caliber.  GALLBLADDER: Within normal limits.  SPLEEN: Within normal limits.  PANCREAS: Within normal limits.  ADRENALS: Withinnormal limits.  KIDNEYS/URETERS: Within normal limits.    BLADDER: Underdistended, which limits evaluation.  REPRODUCTIVE ORGANS: Prostate within normal limits.    BOWEL: No bowel obstruction. Appendix is normal. Minimal colonic   diverticulosis.  PERITONEUM: No ascites.  VESSELS: No abdominal aortic dissection or aneurysm. The celiac trunk,   superior mesenteric, renal, inferior mesenteric, common iliac, external   and internal iliac, and common femoral/proximal superficial femoral   arteries are widely patent.  RETROPERITONEUM/LYMPH NODES: No lymphadenopathy.  ABDOMINAL WALL: Within normal limits.  BONES: Grade 1 anterolisthesis of L5 on S1. Chronic bilateral L5 pars   defects.    IMPRESSION:  1.  No aortic dissection.  2.  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  _ _ _ _ _ _ _ _ _ _ _ _  Over 35 minutes was spent with the patient reviewing the discharge material including medications, follow up appointments, recovery, concerning symptoms, and how to contact their health care providers if they have questions

## 2024-06-02 NOTE — DISCHARGE NOTE PROVIDER - PROVIDER TOKENS
PROVIDER:[TOKEN:[[id number]],FOLLOWUP:[1 week]],PROVIDER:[TOKEN:[[id number]],FOLLOWUP:[2 weeks]] PROVIDER:[TOKEN:[[id number]],FOLLOWUP:[1 week]],PROVIDER:[TOKEN:[[id number]],FOLLOWUP:[2 weeks]],PROVIDER:[TOKEN:[[id number]]] PROVIDER:[TOKEN:[[id number]],FOLLOWUP:[2 weeks]],PROVIDER:[TOKEN:[[id number]],FOLLOWUP:[2 weeks]],PROVIDER:[TOKEN:[[id number]],FOLLOWUP:[2 weeks]]

## 2024-06-02 NOTE — DISCHARGE NOTE PROVIDER - NSDCCPCAREPLAN_GEN_ALL_CORE_FT
PRINCIPAL DISCHARGE DIAGNOSIS  Diagnosis: Subclavian steal syndrome  Assessment and Plan of Treatment:

## 2024-06-02 NOTE — DISCHARGE NOTE NURSING/CASE MANAGEMENT/SOCIAL WORK - NSDCFUADDAPPT_GEN_ALL_CORE_FT
The office will call you with follow-up appointment time tomorrow. If you do not receive a call, please call [phone].

## 2024-06-02 NOTE — DISCHARGE NOTE NURSING/CASE MANAGEMENT/SOCIAL WORK - PATIENT PORTAL LINK FT
You can access the FollowMyHealth Patient Portal offered by [facility] by registering at the following website: [email or web address]. By joining "[employer]’s FollowMyHealth portal, you will also be able to view your health information using other applications (apps) compatible with our system.

## 2024-06-02 NOTE — PROGRESS NOTE ADULT - SUBJECTIVE AND OBJECTIVE BOX
Patient discussed on morning rounds with Dr. Hassan    SUBJECTIVE ASSESSMENT: Patient seen and examined at bedside. Patient states that the numbness and tingling in his L hand still comes and goes. Patient and family members at bedside do not want to leave the hospital until a physician speaks with the patient's uncle, Dr. Franko Corado, (General surgeon at [facility]). Dr. Hassan aware and spoke with patient's uncle.   Patient denies chills, chest pain, SOB, palpitations, N/V.     Vital Signs Last 24 Hrs  T(C): 36.6 (02 Jun 2024 10:01), Max: 36.6 (02 Jun 2024 10:01)  T(F): 97.9 (02 Jun 2024 10:01), Max: 97.9 (02 Jun 2024 10:01)  HR: 60 (02 Jun 2024 13:00) (50 - 92)  BP: 130/65 (02 Jun 2024 13:00) (112/61 - 130/65)  BP(mean): 90 (02 Jun 2024 13:00) (76 - 90)  RR: 18 (02 Jun 2024 13:00) (11 - 18)  SpO2: 96% (02 Jun 2024 13:00) (94% - 98%)    Parameters below as of 02 Jun 2024 09:10  Patient On (Oxygen Delivery Method): room air    I&O's Detail    01 Jun 2024 07:01  -  02 Jun 2024 07:00  --------------------------------------------------------  IN:    Oral Fluid: 200 mL    sodium chloride 0.9%: 700 mL  Total IN: 900 mL    OUT:    Voided (mL): 450 mL  Total OUT: 450 mL    Total NET: 450 mL    02 Jun 2024 07:01  -  02 Jun 2024 13:13  --------------------------------------------------------  IN:    Oral Fluid: 550 mL  Total IN: 550 mL    OUT:  Total OUT: 0 mL    Total NET: 550 mL    CHEST TUBE: None  HELENA DRAIN:  None  EPICARDIAL WIRES: None  TIE DOWNS: None  FINLEY: None    PHYSICAL EXAM:  GENERAL: NAD, lying in bed comfortably  HEAD:  Atraumatic, Normocephalic  EYES: EOMI, PERRLA, conjunctiva and sclera clear  ENT: Moist mucous membranes  NECK: Supple, No JVD  CHEST/LUNG: CTAB  HEART: RRR  ABDOMEN: Soft, Nontender, Nondistended. No hepatomegally  EXTREMITIES:  2+ Peripheral Pulses, brisk capillary refill. No clubbing, cyanosis, or edema  NERVOUS SYSTEM:  Alert & Oriented X3, speech clear. No deficits     LABS:                        14.5   9.20  )-----------( 308      ( 01 Jun 2024 01:01 )             42.5     PT/INR - ( 01 Jun 2024 01:01 )   PT: 13.2 sec;   INR: 1.16       PTT - ( 01 Jun 2024 01:01 )  PTT:28.0 sec    06-01    141  |  108  |  14  ----------------------------<  109<H>  4.1   |  23  |  1.01    Ca    9.0      01 Jun 2024 01:01  Phos  4.0     06-01  Mg     2.0     06-01    TPro  6.3  /  Alb  3.8  /  TBili  0.6  /  DBili  x   /  AST  19  /  ALT  22  /  AlkPhos  68  06-01    Urinalysis Basic - ( 01 Jun 2024 01:01 )    Color: x / Appearance: x / SG: x / pH: x  Gluc: 109 mg/dL / Ketone: x  / Bili: x / Urobili: x   Blood: x / Protein: x / Nitrite: x   Leuk Esterase: x / RBC: x / WBC x   Sq Epi: x / Non Sq Epi: x / Bacteria: x    MEDICATIONS  (STANDING):  heparin   Injectable 5000 Unit(s) SubCutaneous every 8 hours  pantoprazole    Tablet 40 milliGRAM(s) Oral before breakfast  sodium chloride 0.9% lock flush 3 milliLiter(s) IV Push every 8 hours  sodium chloride 0.9%. 1000 milliLiter(s) (100 mL/Hr) IV Continuous <Continuous>    MEDICATIONS  (PRN):  acetaminophen     Tablet .. 650 milliGRAM(s) Oral every 6 hours PRN Mild Pain (1 - 3)  clonazePAM  Tablet 0.5 milliGRAM(s) Oral at bedtime PRN Anxiety    RADIOLOGY & ADDITIONAL TESTS:  < from: CT Angio Abdomen and Pelvis w/ IV Cont (06.01.24 @ 12:29) >  FINDINGS:  CHEST:  LUNGS AND LARGE AIRWAYS: Patent central airways. No pulmonary nodules.  PLEURA: No pleural effusion.  VESSELS: No thoracic aortic dissection, intramural hematoma, or aneurysm.   Right-sided aortic arch. Patent innominate and left common carotid   artery. The proximal left subclavian artery is isolated and not connected   to the aortic arch. This is consistent with RIGHT aortic arch with   isolated LEFT subclavian artery variant. There is opacification ofthe   left subclavian artery only distal to the opacified left vertebral   artery, representing subclavian steal. No central pulmonary embolism.  HEART: Heart size is normal. No pericardial effusion.  MEDIASTINUM AND GUSTABO: No lymphadenopathy.  CHEST WALL AND LOWER NECK: Symmetric bilateral gynecomastia.    ABDOMEN AND PELVIS:  LIVER: Hepatic steatosis.  BILE DUCTS: Normal caliber.  GALLBLADDER: Within normal limits.  SPLEEN: Within normal limits.  PANCREAS: Within normal limits.  ADRENALS: Withinnormal limits.  KIDNEYS/URETERS: Within normal limits.    BLADDER: Underdistended, which limits evaluation.  REPRODUCTIVE ORGANS: Prostate within normal limits.    BOWEL: No bowel obstruction. Appendix is normal. Minimal colonic   diverticulosis.  PERITONEUM: No ascites.  VESSELS: No abdominal aortic dissection or aneurysm. The celiac trunk,   superior mesenteric, renal, inferior mesenteric, common iliac, external   and internal iliac, and common femoral/proximal superficial femoral   arteries are widely patent.  RETROPERITONEUM/LYMPH NODES: No lymphadenopathy.  ABDOMINAL WALL: Within normal limits.  BONES: Grade 1 anterolisthesis of L5 on S1. Chronic bilateral L5 pars   defects.    IMPRESSION:  1.  No aortic dissection.  2.  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      
Patient discussed on morning rounds with Dr. Hassan, transfer to       SUBJECTIVE ASSESSMENT:  28y Male seen and examined. Feels well, offers no acute complaints.         Vital Signs Last 24 Hrs  T(C): 36.5 (01 Jun 2024 12:47), Max: 36.5 (01 Jun 2024 12:47)  T(F): 97.7 (01 Jun 2024 12:47), Max: 97.7 (01 Jun 2024 12:47)  HR: 92 (01 Jun 2024 14:05) (47 - 92)  BP: 124/63 (01 Jun 2024 14:05) (95/60 - 140/70)  BP(mean): 88 (01 Jun 2024 14:05) (70 - 99)  RR: 11 (01 Jun 2024 14:05) (11 - 18)  SpO2: 95% (01 Jun 2024 14:05) (94% - 100%)    Parameters below as of 01 Jun 2024 14:05  Patient On (Oxygen Delivery Method): room air      I&O's Detail    31 May 2024 07:01  -  01 Jun 2024 07:00  --------------------------------------------------------  IN:    Oral Fluid: 100 mL    sodium chloride 0.9%: 1000 mL    Sodium Chloride 0.9% Bolus: 500 mL  Total IN: 1600 mL    OUT:    Voided (mL): 550 mL  Total OUT: 550 mL    Total NET: 1050 mL      01 Jun 2024 07:01  -  01 Jun 2024 15:40  --------------------------------------------------------  IN:    sodium chloride 0.9%: 200 mL  Total IN: 200 mL    OUT:  Total OUT: 0 mL    Total NET: 200 mL        PHYSICAL EXAM:  General: Patient lying comfortably in bed, no acute distress     Neurological: Alert and oriented. No focal neurological deficits     Cardiovascular: S1S2, RRR, no murmurs appreciated on exam     Respiratory: Clear to ausculation bilaterally, no wheeze/rhonchi/rales    Gastrointestinal: + BS, soft, non tender, non distended     Extremities: Warm and well perfused. No edema, no calf tenderness     Vascular: 2+ Peripheral pulses b/l       LABS:                        14.5   9.20  )-----------( 308      ( 01 Jun 2024 01:01 )             42.5       PT/INR - ( 01 Jun 2024 01:01 )   PT: 13.2 sec;   INR: 1.16          PTT - ( 01 Jun 2024 01:01 )  PTT:28.0 sec    06-01    141  |  108  |  14  ----------------------------<  109<H>  4.1   |  23  |  1.01    Ca    9.0      01 Jun 2024 01:01  Phos  4.0     06-01  Mg     2.0     06-01    TPro  6.3  /  Alb  3.8  /  TBili  0.6  /  DBili  x   /  AST  19  /  ALT  22  /  AlkPhos  68  06-01      Urinalysis Basic - ( 01 Jun 2024 01:01 )    Color: x / Appearance: x / SG: x / pH: x  Gluc: 109 mg/dL / Ketone: x  / Bili: x / Urobili: x   Blood: x / Protein: x / Nitrite: x   Leuk Esterase: x / RBC: x / WBC x   Sq Epi: x / Non Sq Epi: x / Bacteria: x        MEDICATIONS  (STANDING):  heparin   Injectable 5000 Unit(s) SubCutaneous every 8 hours  pantoprazole    Tablet 40 milliGRAM(s) Oral before breakfast  sodium chloride 0.9% lock flush 3 milliLiter(s) IV Push every 8 hours  sodium chloride 0.9%. 1000 milliLiter(s) (100 mL/Hr) IV Continuous <Continuous>    MEDICATIONS  (PRN):  acetaminophen     Tablet .. 650 milliGRAM(s) Oral every 6 hours PRN Mild Pain (1 - 3)  clonazePAM  Tablet 0.5 milliGRAM(s) Oral at bedtime PRN Anxiety        RADIOLOGY & ADDITIONAL TESTS:

## 2024-06-02 NOTE — PROGRESS NOTE ADULT - ASSESSMENT
27 YO Male w/ PMHx of anxiety who initially presented to [facility] c/o LUE numbness and tingling since 08/2023. Work up and evaluation at OSH included CT which revealed right-sided arch with a severely atretic versus occluded left subclavian artery origin between the aorta and the confluence of the left subclavian and left vertebral arteries. This is consistent with the clinical suspicion of subclavian steal. Transferred to [facility] for further evaluation and admitted to CTICU under the care of Dr. Tolentino. Repeat CT scan reviewed by Dr. Hassan, vascular consulted for thoracic outlet syndrome. Per Vascular team, no indication for intervention acutely and recommended outpatient follow-up. Transferred to [facility]. On 6/2/24 Thoracic surgery Dr. Zamorano consulted for possible thoracic outlet syndrome, recommended outpatient work-up.    Plan:    Neurovascular:   -Hx of anxiety  -Cont home Klonopin PRN  -Pain well controlled with current regimen. PRN's: Tylenol     Vascular:  -Subclavian steal syndrome vs thoracic outlet syndrome  -Vascular consulted, recs appreciated. No acute intervention, f/u outpatient   -R >L BP difference  -Thoracic surgery consulted, rec f/u outpatient for further work-up to r/o thoracic outlet syndrome    Cardiovascular:   -Hemodynamically stable.   -Monitor: BP, HR, tele    Respiratory:   -Oxygenating well on room air  -Encourage continued use of IS 10x/hr and frequent ambulation  -CXR: stable    GI:  -GI PPX: Protonix  -PO Diet  -Bowel Regimen: none    Renal / :  -Continue to monitor renal function: BUN/Cr: 14/1.01  -Monitor I/O's daily     Endocrine:    -No hx of DM or thyroid dx  -A1c: 5.1  -TSH: 0.718    Hematologic:  -CBC: H/H- 14/42  -Coagulation Panel.    ID:  -Temperature: Afebrile  -CBC: WBC- 9    Prophylaxis:  -DVT prophylaxis with 5000 SubQ Heparin q8h.  -Continue with SCD's b/l while patient is at rest     Disposition:  -Discharge home once patient is medically ready

## 2024-06-02 NOTE — DISCHARGE NOTE PROVIDER - NSDCMRMEDTOKEN_GEN_ALL_CORE_FT
Aspir 81 oral delayed release tablet: 1 tab(s) orally once a day  clonazePAM 0.5 mg oral tablet: 1 tab(s) orally once a day (at bedtime) as needed for  anxiety  pantoprazole 40 mg oral delayed release tablet: 1 tab(s) orally once a day   Aspir 81 oral delayed release tablet: 1 tab(s) orally once a day  clonazePAM 0.5 mg oral tablet: 1 tab(s) orally once a day (at bedtime) as needed for  anxiety  pantoprazole 40 mg oral delayed release tablet: 1 tab(s) orally once a day  Tylenol 325 mg oral tablet: 2 tab(s) orally every 6 hours As needed Mild Pain (1 - 3)

## 2024-06-02 NOTE — DISCHARGE NOTE PROVIDER - CARE PROVIDERS DIRECT ADDRESSES
,DirectAddress_Unknown,[email or web address] ,DirectAddress_Unknown,[email or web address]

## 2024-06-02 NOTE — CHART NOTE - NSCHARTNOTEFT_GEN_A_CORE
Patient seen this AM with Dr. Hassan during morning rounds. Dr. Hassan reviewed the CT scan and communicated with patient that there is no aortic dissection seen. 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 This was communicated with the patient, and he was reassured that we would get him outpatient follow-up with Vascular surgeon Dr. Boss once our outpatient team returns to work tomorrow.     Thoracic surgeon Dr. Zamorano was consulted for r/o thoracic outlet syndrome. Patient was seen by Dr. Zamorano and I at the bedside, who educated patient on what thoracic outlet syndrome is, the work-up needed and potential courses of treatment including PT vs surgical intervention, pending an official diagnosis. Patient was told that he would need further MRIs to diagnose thoracic outlet syndrome, which would need to be completed outpatient. Dr. Zamorano recommended the patient be discharged and follow-up with him outpatient for this work-up. Patient wanted to discuss plan with his parents.    I returned to the patient's room after his parents arrived to discuss discharge plan. Patient and family stated that they did not want to leave the hospital until a physician communicated the findings and plan with his uncle, Dr. Franko Corado, (General surgeon at [facility]). Dr. Hassan discussed the plan and recommendations with Mario Christine, who agreed and called the patient.     I was working on the discharge paperwork and returned to the patient's room to confirm if the patient had been taking Aspirin at home, as it was reflected on his admission medications. He stated that he was taking it for two days while at OSH. I spoke with Dr. Hassan regarding indication for continuing ASA, for which he stated it was okay to continue. I communicated with the patient that he should continue the aspirin for stroke prevention while we are still figuring out a diagnosis and that the benefits outweigh the risks. The patient and his family then requested a copy of the CT scans. I told them that they would have to go to medical records in the hospital during normal business hours if they would like a copy of the disc, but that I could not get that for him today. I printed out a copy of the report of the CT and gave it to the patient, but reiterated that he would have to go to medical records for the disc. I told the patient that if he went to a [facility] facility or physician, they would have access to the CT, but other healthcare systems do not and would likely request a copy of the disc vs repeating scans.     I was then called again by the RN to return to the room to further discuss the need for aspirin, as the patient still had concerns while she was completing the discharge. Patient's spouse states that when I told the patient that he could take Aspirin, I gave him panic attacks and made him very anxious. I communicated that he should be informed as to what and why he was taking his medication. Patient's spouse then asked multiple times why he could not just stay in the hospital for the MRI work-up, to which I again stated that it was not indicated at this time for him to remain in the hospital and this is an outpatient work-up as there is no acute pathology. Patient and spouse were visibly frustrated when I left the room.     Told by RN that patient's spouse requested to write down my first and last name, which was given.     Of note, each time I went into the patient's room, I was questioned as to whether I would get the patient outpatient appointments. I reassured the patient multiple times that I would send an email to the outpatient office requesting appointments for him, and that they should call him tomorrow. If he did not receive a call, I indicated that there is a phone number on his paperwork that he could call if there were any issues. I reiterated multiple times that I could not make appointments for him, and that the outpatient team is not present on a Sunday.     Patient was then discharged home.

## 2024-06-03 PROBLEM — Z00.00 ENCOUNTER FOR PREVENTIVE HEALTH EXAMINATION: Status: ACTIVE | Noted: 2024-06-03

## 2024-06-04 ENCOUNTER — APPOINTMENT (OUTPATIENT)
Dept: VASCULAR SURGERY | Facility: CLINIC | Age: 29
End: 2024-06-04
Payer: MEDICAID

## 2024-06-04 VITALS
HEIGHT: 70 IN | SYSTOLIC BLOOD PRESSURE: 145 MMHG | BODY MASS INDEX: 27.92 KG/M2 | HEART RATE: 97 BPM | DIASTOLIC BLOOD PRESSURE: 79 MMHG | WEIGHT: 195 LBS

## 2024-06-04 PROCEDURE — 99204 OFFICE O/P NEW MOD 45 MIN: CPT

## 2024-06-04 PROCEDURE — 93880 EXTRACRANIAL BILAT STUDY: CPT

## 2024-06-05 NOTE — ASSESSMENT
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  Pt was transferred to [facility] under Dr. Tolentino for further evaluation and management.  Pt reports previous ~10p-y smoking hx (quit 3mos prior)/denies any significant family hx.  Pt currently c/w ASA, clonazepam, pantoprazole.  Pt denies claudication in the LUE w/use, though he does report isolated episodes of numbness/tingling in the L arm w/forced, sustained abduction, and radiating abnormal sensations from the L neck.  He also denies any dizziness/imbalance w/use of the LUE suggesting steal.  BP is lower in the LUE compared w/RUE but radial pulses are palpable b/l, and no evidence of ischemia is noted in the LUE.  Carotid duplex performed to evaluate for SCA steal demonstrates normal ICA b/l but reversal of flow noted in the L vertebral artery due to possible occlusion of the origin of the SCA.  Reassured pt that as he is asymptomatic from the L SCA occlusion, no intervention is indicated at this time.  Recommend he continue all activities but avoid repeated overhead activity, suggest he f/u w/neuro and CTSurg for possible nerve impingement/nTOS.  He will RTO PRN.

## 2024-06-05 NOTE — HISTORY OF PRESENT ILLNESS
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  Pt was transferred to [facility] under Dr. Tolentino for further evaluation and management.  Pt reports previous ~10p-y smoking hx (quit 3mos prior)/denies any significant family hx.  Pt currently c/w ASA, clonazepam, pantoprazole.  Pt denies claudication in the LUE w/use, though he does report isolated episodes of numbness/tingling in the L arm w/forced, sustained abduction, and radiating abnormal sensations from the L neck.  He also denies any dizziness/imbalance w/use of the LUE suggesting steal.

## 2024-06-05 NOTE — PROCEDURE
[FreeTextEntry1] : Carotid duplex performed to evaluate for SCA steal demonstrates normal ICA b/l but reversal of flow noted in the L vertebral artery due to possible occlusion of the origin of the SCA.

## 2024-06-05 NOTE — ADDENDUM
[FreeTextEntry1] : This note was written by Demar Kraft, acting as a scribe for Dr. Florencio Boss.  I, Dr. Florencio Boss, have read and attest that all the information, medical decision-making, and discharge instructions within are true and accurate.  I, Dr. Florencio Boss, personally performed the evaluation and management (E/M) services for this new patient.  That E/M includes conducting the initial examination, assessing all conditions, and establishing the plan of care.  Today, my ACP, Demar Kraft, was here to observe my evaluation and management services for this patient to be followed going forward.

## 2024-06-05 NOTE — REASON FOR VISIT
[Consultation] : a consultation visit [Spouse] : spouse [FreeTextEntry1] : Suspicion of SCA steal syndrome

## 2024-06-05 NOTE — PHYSICAL EXAM
[Normal Thyroid] : the thyroid was normal [Normal Breath Sounds] : Normal breath sounds [Respiratory Effort] : normal respiratory effort [Normal Heart Sounds] : normal heart sounds [Normal Rate and Rhythm] : normal rate and rhythm [2+] : left 2+ [No Rash or Lesion] : No rash or lesion [Alert] : alert [Calm] : calm [JVD] : no jugular venous distention  [Carotid Bruits] : no carotid bruits [Right Carotid Bruit] : no bruit heard over the right carotid [Left Carotid Bruit] : no bruit heard over the left carotid [Purpura] : no purpura  [Petechiae] : no petechiae [Skin Ulcer] : no ulcer [Skin Induration] : no induration [de-identified] : Healthy, NAD [de-identified] : NC/AT, anicteric [de-identified] : FROM throughout, strength 5/5x4, no palpable cords in UEs b/l [de-identified] : Normosensory in UEs b/l

## 2024-06-07 DIAGNOSIS — F41.9 ANXIETY DISORDER, UNSPECIFIED: ICD-10-CM

## 2024-06-07 DIAGNOSIS — G45.8 OTHER TRANSIENT CEREBRAL ISCHEMIC ATTACKS AND RELATED SYNDROMES: ICD-10-CM

## 2024-06-07 DIAGNOSIS — Z79.82 LONG TERM (CURRENT) USE OF ASPIRIN: ICD-10-CM

## 2024-06-07 DIAGNOSIS — Z87.891 PERSONAL HISTORY OF NICOTINE DEPENDENCE: ICD-10-CM

## 2024-06-12 ENCOUNTER — NON-APPOINTMENT (OUTPATIENT)
Age: 29
End: 2024-06-12

## 2024-06-13 DIAGNOSIS — F41.9 ANXIETY DISORDER, UNSPECIFIED: ICD-10-CM

## 2024-06-18 ENCOUNTER — APPOINTMENT (OUTPATIENT)
Dept: CARDIOTHORACIC SURGERY | Facility: CLINIC | Age: 29
End: 2024-06-18

## 2024-06-20 ENCOUNTER — NON-APPOINTMENT (OUTPATIENT)
Age: 29
End: 2024-06-20

## 2024-06-20 ENCOUNTER — APPOINTMENT (OUTPATIENT)
Dept: THORACIC SURGERY | Facility: CLINIC | Age: 29
End: 2024-06-20
Payer: MEDICAID

## 2024-06-20 VITALS
OXYGEN SATURATION: 99 % | DIASTOLIC BLOOD PRESSURE: 65 MMHG | TEMPERATURE: 97.3 F | SYSTOLIC BLOOD PRESSURE: 131 MMHG | HEIGHT: 70 IN | WEIGHT: 198 LBS | BODY MASS INDEX: 28.35 KG/M2 | HEART RATE: 79 BPM

## 2024-06-20 DIAGNOSIS — Z87.891 PERSONAL HISTORY OF NICOTINE DEPENDENCE: ICD-10-CM

## 2024-06-20 DIAGNOSIS — R20.0 ANESTHESIA OF SKIN: ICD-10-CM

## 2024-06-20 PROCEDURE — 99214 OFFICE O/P EST MOD 30 MIN: CPT

## 2024-06-20 NOTE — SOCIAL HISTORY
[TextEntry] : Lung TB history:  COVID hx/vaccination:   Occupation: employed vs retired; former ?   Patient lives with family/alone/with home aid   Patient denies any major mental health history   Alcohol Consumption:   Recreational Drug use:   Restrictions against blood products?

## 2024-06-21 PROBLEM — Z87.891 FORMER SMOKER: Status: ACTIVE | Noted: 2024-06-04

## 2024-06-21 RX ORDER — CLONAZEPAM 0.25 MG/1
0.25 TABLET, ORALLY DISINTEGRATING ORAL
Refills: 0 | Status: ACTIVE | COMMUNITY

## 2024-06-21 RX ORDER — PANTOPRAZOLE SODIUM 40 MG/1
40 GRANULE, DELAYED RELEASE ORAL
Refills: 0 | Status: COMPLETED | COMMUNITY

## 2024-06-21 NOTE — HISTORY OF PRESENT ILLNESS
[FreeTextEntry1] : Patient is a 29 y/o male, former smoker (1 PPD x 7-9 years; quit 4 months ago) with a PMHx of anxiety. He presents today for TOS evaluation.   Patient had an episode of left upper extremity numbness and tingling in Aug 2023 during driving. Went to ED and was told to have costochondritis. Symptoms resolved without intervention. Symptoms recurred since Feb 2024 and became persistent.   He initially presented to [facility] on 5/31/24 for left anterior chest pain, left upper extremity numbness.  CT performed at OSH revealed right-sided arch with a severely atretic versus occluded left subclavian artery origin between the aorta and the confluence of the left subclavian and left vertebral arteries, consistent with the clinical suspicion of subclavian steal. He was transferred to [facility] under the care of Dr. Tolentino for further evaluation. Repeat CT scan reviewed by Dr. Hassan showed concern for TOS. Vascular team was consulted who recommended no acute intervention but outpatient follow-up for TOS.   Workup noted below.  CTA Chest / Abdomen / Pelvis performed on 6/1/24 while inpt:  1.  No aortic dissection. 2.  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  He reports feeling some pins and needles in his left fingertips at times. His main complaint is pain in the shoulder around scapula in the back. These are the same symptoms he has been having on and off for several months. There's no discoloration or coldness.

## 2024-06-21 NOTE — PHYSICAL EXAM
[Fully active, able to carry on all pre-disease performance without restriction] : Status 0 - Fully active, able to carry on all pre-disease performance without restriction [Neck Appearance] : the appearance of the neck was normal [Neck Cervical Mass (___cm)] : no neck mass was observed [Jugular Venous Distention Increased] : there was no jugular-venous distention [Thyroid Diffuse Enlargement] : the thyroid was not enlarged [Thyroid Nodule] : there were no palpable thyroid nodules [] : no respiratory distress [Auscultation Breath Sounds / Voice Sounds] : lungs were clear to auscultation bilaterally [Heart Rate And Rhythm] : heart rate was normal and rhythm regular [Heart Sounds] : normal S1 and S2 [Heart Sounds Gallop] : no gallops [Murmurs] : no murmurs [Heart Sounds Pericardial Friction Rub] : no pericardial rub [Examination Of The Chest] : the chest was normal in appearance [Chest Visual Inspection Thoracic Asymmetry] : no chest asymmetry [Diminished Respiratory Excursion] : normal chest expansion [2+] : left 2+ [FreeTextEntry1] : left mid-scapula tenderness [Deep Tendon Reflexes (DTR)] : deep tendon reflexes were 2+ and symmetric [Sensation] : the sensory exam was normal to light touch and pinprick [No Focal Deficits] : no focal deficits [Oriented To Time, Place, And Person] : oriented to person, place, and time [Impaired Insight] : insight and judgment were intact [Affect] : the affect was normal

## 2024-06-21 NOTE — HISTORY OF PRESENT ILLNESS
[FreeTextEntry1] : 27 YO Male w/ PMHx of anxiety who initially presented to [facility] c/o LUE numbness and tingling since 08/2023. Work up and evaluation at OSH included CT which revealed right-sided arch with a severely atretic versus occluded left subclavian artery origin between the aorta and the confluence of the left subclavian and left vertebral arteries. This is consistent with the clinical suspicion of subclavian steal. Transferred to [facility] for further evaluation and admitted to CTICU under the care of Dr. Tolentino. Repeat CT scan reviewed by Dr. Hassan, vascular consulted for thoracic outlet syndrome. Per Vascular team, no indication for intervention acutely and recommended outpatient follow-up. Transferred to [facility]. On 6/2/24 Thoracic surgery Dr. Zamorano consulted for possible thoracic outlet syndrome, recommended outpatient work-up. Patient discharged home on 6/2/24. He presents for a follow up visit.

## 2024-06-21 NOTE — REVIEW OF SYSTEMS
[As Noted in HPI] : as noted in HPI [Anxiety] : anxiety [Negative] : Genitourinary [FreeTextEntry9] : left anterior chest pain, left scapular pain

## 2024-06-21 NOTE — ASSESSMENT
[FreeTextEntry1] : 27 y/o male, former smoker (1 PPD x 7-9 years; quit 4 months ago) with a PMHx of anxiety.  He presents today for TOS follow up.   He has had LUE numbness and tingling since 08/2023. He reports feeling some pins and needles in her left fingertips at times. His main complaint is pain in the shoulder around scapula in the back.  These are the same symptoms her has been having on and off for several months. He was recently hospitalized in [facility] May 31-Jun 2 for acute left chest pain and LUE numbness. CT performed at OSH revealed right-sided arch with a severely atretic versus occluded left subclavian artery origin between the aorta and the confluence of the left subclavian and left vertebral arteries. This is consistent with the clinical suspicion of subclavian steal.   Workup noted below.  CTA Chest / Abdomen / Pelvis performed on 6/1/24 while inpt:  1.  No aortic dissection. 2.  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  His symptoms were reviewed and do not seem consistent with neurogenic TOS or vascular TOS. He has pulses in his left hand and although his BP is lower in the LUE compared w/RUE but radial pulses are palpable with no ischemia as per examination and vascular surgery review. We will obtain an MRI of his left brachial plexus to rule out any impingement of his nerves, as well as a cervical MRI to assess cervical nerve impingement. He will followup afterwards.  Plan: - MRI of Left Brachial Plexus to rule out neurogenic TOS - Cervical C-spine MRI - Televisit to review results  I, Dr. Henry Zamorano MD, personally performed the evaluation and management (E/M) services for this new patient.  That E/M includes conducting the clinically appropriate initial history &/or exam, assessing all conditions, and establishing the plan of care.  I have also independently reviewed the medical records and imaging at the time of this office visit, and discussed the above mentioned images with interpretations with the patient. Today, my ANA Katie Brown, was here to observe &/or participate in the visit & follow plan of care established by me.

## 2024-06-25 ENCOUNTER — NON-APPOINTMENT (OUTPATIENT)
Age: 29
End: 2024-06-25

## 2024-06-27 ENCOUNTER — NON-APPOINTMENT (OUTPATIENT)
Age: 29
End: 2024-06-27

## 2024-06-27 DIAGNOSIS — R20.0 ANESTHESIA OF SKIN: ICD-10-CM

## 2024-06-27 DIAGNOSIS — R20.2 ANESTHESIA OF SKIN: ICD-10-CM

## 2024-06-27 DIAGNOSIS — G54.0 BRACHIAL PLEXUS DISORDERS: ICD-10-CM

## 2024-07-05 ENCOUNTER — OUTPATIENT (OUTPATIENT)
Dept: OUTPATIENT SERVICES | Facility: HOSPITAL | Age: 29
LOS: 1 days | End: 2024-07-05
Payer: MEDICAID

## 2024-07-05 ENCOUNTER — APPOINTMENT (OUTPATIENT)
Dept: MRI IMAGING | Facility: HOSPITAL | Age: 29
End: 2024-07-05

## 2024-07-05 ENCOUNTER — EMERGENCY (EMERGENCY)
Facility: HOSPITAL | Age: 29
LOS: 1 days | Discharge: HOME | End: 2024-07-05
Attending: STUDENT IN AN ORGANIZED HEALTH CARE EDUCATION/TRAINING PROGRAM | Admitting: STUDENT IN AN ORGANIZED HEALTH CARE EDUCATION/TRAINING PROGRAM
Payer: COMMERCIAL

## 2024-07-05 VITALS
TEMPERATURE: 98 F | HEIGHT: 70 IN | OXYGEN SATURATION: 99 % | RESPIRATION RATE: 18 BRPM | WEIGHT: 205.03 LBS | SYSTOLIC BLOOD PRESSURE: 138 MMHG | DIASTOLIC BLOOD PRESSURE: 74 MMHG | HEART RATE: 90 BPM

## 2024-07-05 VITALS
HEART RATE: 71 BPM | OXYGEN SATURATION: 98 % | SYSTOLIC BLOOD PRESSURE: 124 MMHG | DIASTOLIC BLOOD PRESSURE: 70 MMHG | TEMPERATURE: 98 F | RESPIRATION RATE: 17 BRPM

## 2024-07-05 LAB
ANION GAP SERPL CALC-SCNC: 11 MMOL/L — SIGNIFICANT CHANGE UP (ref 5–17)
BASOPHILS # BLD AUTO: 0.07 K/UL — SIGNIFICANT CHANGE UP (ref 0–0.2)
BASOPHILS NFR BLD AUTO: 0.8 % — SIGNIFICANT CHANGE UP (ref 0–2)
BUN SERPL-MCNC: 10 MG/DL — SIGNIFICANT CHANGE UP (ref 7–23)
CALCIUM SERPL-MCNC: 9.4 MG/DL — SIGNIFICANT CHANGE UP (ref 8.4–10.5)
CHLORIDE SERPL-SCNC: 96 MMOL/L — SIGNIFICANT CHANGE UP (ref 96–108)
CO2 SERPL-SCNC: 24 MMOL/L — SIGNIFICANT CHANGE UP (ref 22–31)
CREAT SERPL-MCNC: 0.92 MG/DL — SIGNIFICANT CHANGE UP (ref 0.5–1.3)
EGFR: 116 ML/MIN/1.73M2 — SIGNIFICANT CHANGE UP
EOSINOPHIL # BLD AUTO: 0.13 K/UL — SIGNIFICANT CHANGE UP (ref 0–0.5)
EOSINOPHIL NFR BLD AUTO: 1.4 % — SIGNIFICANT CHANGE UP (ref 0–6)
GLUCOSE SERPL-MCNC: 92 MG/DL — SIGNIFICANT CHANGE UP (ref 70–99)
HCT VFR BLD CALC: 47.6 % — SIGNIFICANT CHANGE UP (ref 39–50)
HGB BLD-MCNC: 16.4 G/DL — SIGNIFICANT CHANGE UP (ref 13–17)
IMM GRANULOCYTES NFR BLD AUTO: 1.3 % — HIGH (ref 0–0.9)
LYMPHOCYTES # BLD AUTO: 2.01 K/UL — SIGNIFICANT CHANGE UP (ref 1–3.3)
LYMPHOCYTES # BLD AUTO: 22 % — SIGNIFICANT CHANGE UP (ref 13–44)
MCHC RBC-ENTMCNC: 28.1 PG — SIGNIFICANT CHANGE UP (ref 27–34)
MCHC RBC-ENTMCNC: 34.5 GM/DL — SIGNIFICANT CHANGE UP (ref 32–36)
MCV RBC AUTO: 81.5 FL — SIGNIFICANT CHANGE UP (ref 80–100)
MONOCYTES # BLD AUTO: 0.79 K/UL — SIGNIFICANT CHANGE UP (ref 0–0.9)
MONOCYTES NFR BLD AUTO: 8.6 % — SIGNIFICANT CHANGE UP (ref 2–14)
NEUTROPHILS # BLD AUTO: 6.02 K/UL — SIGNIFICANT CHANGE UP (ref 1.8–7.4)
NEUTROPHILS NFR BLD AUTO: 65.9 % — SIGNIFICANT CHANGE UP (ref 43–77)
NRBC # BLD: 0 /100 WBCS — SIGNIFICANT CHANGE UP (ref 0–0)
PLATELET # BLD AUTO: 324 K/UL — SIGNIFICANT CHANGE UP (ref 150–400)
POTASSIUM SERPL-MCNC: 3.7 MMOL/L — SIGNIFICANT CHANGE UP (ref 3.5–5.3)
POTASSIUM SERPL-SCNC: 3.7 MMOL/L — SIGNIFICANT CHANGE UP (ref 3.5–5.3)
RBC # BLD: 5.84 M/UL — HIGH (ref 4.2–5.8)
RBC # FLD: 13 % — SIGNIFICANT CHANGE UP (ref 10.3–14.5)
SODIUM SERPL-SCNC: 131 MMOL/L — LOW (ref 135–145)
TROPONIN T, HIGH SENSITIVITY RESULT: <6 NG/L — SIGNIFICANT CHANGE UP (ref 0–51)
WBC # BLD: 9.14 K/UL — SIGNIFICANT CHANGE UP (ref 3.8–10.5)
WBC # FLD AUTO: 9.14 K/UL — SIGNIFICANT CHANGE UP (ref 3.8–10.5)

## 2024-07-05 PROCEDURE — 71552 MRI CHEST W/O & W/DYE: CPT

## 2024-07-05 PROCEDURE — 71552 MRI CHEST W/O & W/DYE: CPT | Mod: 26

## 2024-07-05 PROCEDURE — 36415 COLL VENOUS BLD VENIPUNCTURE: CPT

## 2024-07-05 PROCEDURE — A9585: CPT

## 2024-07-05 PROCEDURE — 85025 COMPLETE CBC W/AUTO DIFF WBC: CPT

## 2024-07-05 PROCEDURE — 71045 X-RAY EXAM CHEST 1 VIEW: CPT | Mod: 26

## 2024-07-05 PROCEDURE — 84484 ASSAY OF TROPONIN QUANT: CPT

## 2024-07-05 PROCEDURE — 71045 X-RAY EXAM CHEST 1 VIEW: CPT

## 2024-07-05 PROCEDURE — 99285 EMERGENCY DEPT VISIT HI MDM: CPT

## 2024-07-05 PROCEDURE — 99283 EMERGENCY DEPT VISIT LOW MDM: CPT | Mod: 25

## 2024-07-05 PROCEDURE — 80048 BASIC METABOLIC PNL TOTAL CA: CPT

## 2024-07-05 RX ORDER — SODIUM CHLORIDE 0.9 % (FLUSH) 0.9 %
1000 SYRINGE (ML) INJECTION ONCE
Refills: 0 | Status: COMPLETED | OUTPATIENT
Start: 2024-07-05 | End: 2024-07-05

## 2024-07-05 RX ADMIN — Medication 1000 MILLILITER(S): at 12:05

## 2024-07-08 DIAGNOSIS — R06.02 SHORTNESS OF BREATH: ICD-10-CM

## 2024-07-08 DIAGNOSIS — F41.9 ANXIETY DISORDER, UNSPECIFIED: ICD-10-CM

## 2024-07-08 DIAGNOSIS — M54.9 DORSALGIA, UNSPECIFIED: ICD-10-CM

## 2024-07-08 DIAGNOSIS — R07.89 OTHER CHEST PAIN: ICD-10-CM

## 2024-07-11 ENCOUNTER — APPOINTMENT (OUTPATIENT)
Dept: THORACIC SURGERY | Facility: CLINIC | Age: 29
End: 2024-07-11
Payer: MEDICAID

## 2024-07-11 ENCOUNTER — TRANSCRIPTION ENCOUNTER (OUTPATIENT)
Age: 29
End: 2024-07-11

## 2024-07-11 DIAGNOSIS — M79.7 FIBROMYALGIA: ICD-10-CM

## 2024-07-11 PROCEDURE — 99212 OFFICE O/P EST SF 10 MIN: CPT

## 2024-07-28 NOTE — H&P ADULT - NSHPPOADEEPVENOUSTHROMB_GEN_A_CORE
Pt reassessed A/O times 4 Vs stable remain safety ,pt Waldron catheter order is placed tolerated well ,pt did  urinate prior to Waldron catheter was placed ,tolerated well, comfort provide pt was seen evaluate by Ed attending with order to be D/C to NH .waiting for transport on going nursing observation . no

## 2024-09-18 ENCOUNTER — NON-APPOINTMENT (OUTPATIENT)
Age: 29
End: 2024-09-18

## 2024-09-18 ENCOUNTER — APPOINTMENT (OUTPATIENT)
Dept: HEART AND VASCULAR | Facility: CLINIC | Age: 29
End: 2024-09-18
Payer: COMMERCIAL

## 2024-09-18 VITALS
WEIGHT: 208 LBS | SYSTOLIC BLOOD PRESSURE: 136 MMHG | OXYGEN SATURATION: 99 % | BODY MASS INDEX: 29.78 KG/M2 | HEIGHT: 70 IN | DIASTOLIC BLOOD PRESSURE: 75 MMHG | HEART RATE: 76 BPM

## 2024-09-18 PROCEDURE — 99204 OFFICE O/P NEW MOD 45 MIN: CPT | Mod: 25

## 2024-09-18 PROCEDURE — 93000 ELECTROCARDIOGRAM COMPLETE: CPT

## 2024-09-18 NOTE — HISTORY OF PRESENT ILLNESS
[FreeTextEntry1] : 28-year-old male with past medical history significant for smoking which she discontinued 7 months ago and a family history of coronary artery disease with a father with triple bypass surgery in his 40s presents for evaluation of atypical chest pain  He was diagnosed with subclavian steal syndrome when he was transferred from [facility] with acute chest pain which was costochondritis  He is overall a healthy 28-year-old man he is able to exercise without any significant symptomatology, over the last few weeks he has been having atypical chest pain which he considers to be muscular in nature and I agree as his workups have been negative

## 2024-09-18 NOTE — DISCUSSION/SUMMARY
[FreeTextEntry1] : 1.Chest pain- atyp in nature, patient health anxiety elevated , reassured that he is low pretest prob but will order SE for confirmation   2.Cholesterol- pmd referral   [EKG obtained to assist in diagnosis and management of assessed problem(s)] : EKG obtained to assist in diagnosis and management of assessed problem(s)

## 2024-09-19 ENCOUNTER — APPOINTMENT (OUTPATIENT)
Dept: HEART AND VASCULAR | Facility: CLINIC | Age: 29
End: 2024-09-19

## 2024-09-26 ENCOUNTER — APPOINTMENT (OUTPATIENT)
Dept: HEART AND VASCULAR | Facility: CLINIC | Age: 29
End: 2024-09-26
Payer: COMMERCIAL

## 2024-09-26 PROCEDURE — ZZZZZ: CPT | Mod: NC

## 2024-09-26 PROCEDURE — 93351 STRESS TTE COMPLETE: CPT

## 2024-09-27 NOTE — DISCHARGE NOTE PROVIDER - NSDCQMAMI_CARD_ALL_CORE
No
Detail Level: Zone
Initiate Treatment: Start ketoconazole twice a week on scalp
Plan: Talked about nutrafol postpartum